# Patient Record
Sex: MALE | Race: BLACK OR AFRICAN AMERICAN | URBAN - METROPOLITAN AREA
[De-identification: names, ages, dates, MRNs, and addresses within clinical notes are randomized per-mention and may not be internally consistent; named-entity substitution may affect disease eponyms.]

---

## 2023-09-11 LAB — GROUP A STREP, PCR: NOT DETECTED

## 2024-02-27 PROCEDURE — 87800 DETECT AGNT MULT DNA DIREC: CPT

## 2024-02-28 ENCOUNTER — LAB REQUISITION (OUTPATIENT)
Dept: LAB | Facility: HOSPITAL | Age: 24
End: 2024-02-28

## 2024-02-28 DIAGNOSIS — Z20.2 CONTACT WITH AND (SUSPECTED) EXPOSURE TO INFECTIONS WITH A PREDOMINANTLY SEXUAL MODE OF TRANSMISSION: ICD-10-CM

## 2024-02-28 LAB
C TRACH RRNA SPEC QL NAA+PROBE: NEGATIVE
N GONORRHOEA DNA SPEC QL PROBE+SIG AMP: NEGATIVE

## 2024-10-02 ENCOUNTER — OFFICE VISIT (OUTPATIENT)
Dept: URGENT CARE | Age: 24
End: 2024-10-02
Payer: COMMERCIAL

## 2024-10-02 VITALS — HEART RATE: 97 BPM | TEMPERATURE: 98.5 F | OXYGEN SATURATION: 96 %

## 2024-10-02 DIAGNOSIS — Z20.828 EXPOSURE TO SARS-ASSOCIATED CORONAVIRUS: Primary | ICD-10-CM

## 2024-10-02 LAB — POC SARS-COV-2 AG BINAX: NORMAL

## 2024-10-02 NOTE — PROGRESS NOTES
Subjective   Patient ID: Joselito Rivas is a 24 y.o. male. They present today with a chief complaint of Request For Covid Testing.    History of Present Illness  States he had a positive COVID test on Sunday and needs proof of a negative one to return to work. No symptoms.     Past Medical History  Allergies as of 10/02/2024    (No Known Allergies)       (Not in a hospital admission)       Past Medical History:   Diagnosis Date    Allergy status to unspecified drugs, medicaments and biological substances     History of seasonal allergies    Chlamydial infection, unspecified 10/14/2017    Chlamydia    Personal history of other specified conditions 09/06/2022    History of headache       Past Surgical History:   Procedure Laterality Date    OTHER SURGICAL HISTORY  07/31/2014    Dental Surgery            Review of Systems  Review of Systems                               Objective    Vitals:    10/02/24 1940   Pulse: 97   Temp: 36.9 °C (98.5 °F)   SpO2: 96%     No LMP for male patient.    Physical Exam  Constitutional:       Appearance: Normal appearance.   HENT:      Head: Normocephalic and atraumatic.   Cardiovascular:      Rate and Rhythm: Normal rate and regular rhythm.      Pulses: Normal pulses.      Heart sounds: Normal heart sounds.   Pulmonary:      Effort: Pulmonary effort is normal.      Breath sounds: Normal breath sounds.     Procedures    Point of Care Test & Imaging Results from this visit  No results found for this visit on 10/02/24.   No results found.    Diagnostic study results (if any) were reviewed by [facility].    Assessment/Plan   Allergies, medications, history, and pertinent labs/EKGs/Imaging reviewed by ERIKA Abreu-CNP.     Medical Decision Making  COVID test was negative.  Patient was provided with a note for his work regarding Negative COVID test.  At time of discharge patient was clinically well-appearing and HDS for outpatient management. The patient and/or family was  educated regarding diagnosis, supportive care, OTC and Rx medications. The patient and/or family was given the opportunity to ask questions prior to discharge.  They verbalized understanding of my discussion of the plans for treatment, expected course, indications to return to  or seek further evaluation in ED, and the need for timely follow up as directed.   They were provided with a work/school excuse if requested.      Orders and Diagnoses  There are no diagnoses linked to this encounter.    Medical Admin Record      Patient disposition: Home    Electronically signed by [facility]  7:41 PM

## 2024-12-03 ENCOUNTER — OFFICE VISIT (OUTPATIENT)
Dept: URGENT CARE | Age: 24
End: 2024-12-03
Payer: COMMERCIAL

## 2024-12-03 VITALS
HEART RATE: 73 BPM | SYSTOLIC BLOOD PRESSURE: 151 MMHG | RESPIRATION RATE: 17 BRPM | DIASTOLIC BLOOD PRESSURE: 79 MMHG | BODY MASS INDEX: 23.75 KG/M2 | TEMPERATURE: 98.2 F | WEIGHT: 180 LBS | OXYGEN SATURATION: 100 %

## 2024-12-03 DIAGNOSIS — R19.7 DIARRHEA, UNSPECIFIED TYPE: ICD-10-CM

## 2024-12-03 DIAGNOSIS — R11.2 NAUSEA AND VOMITING IN ADULT: Primary | ICD-10-CM

## 2024-12-03 RX ORDER — LOPERAMIDE HCL 2 MG
2 TABLET ORAL 4 TIMES DAILY PRN
Qty: 30 TABLET | Refills: 0 | Status: SHIPPED | OUTPATIENT
Start: 2024-12-03 | End: 2024-12-13

## 2024-12-03 RX ORDER — ONDANSETRON 4 MG/1
4 TABLET, ORALLY DISINTEGRATING ORAL EVERY 8 HOURS PRN
Qty: 20 TABLET | Refills: 0 | Status: SHIPPED | OUTPATIENT
Start: 2024-12-03 | End: 2024-12-10

## 2024-12-03 NOTE — PROGRESS NOTES
Subjective   Patient ID: Joselito Rivas is a 24 y.o. male. They present today with a chief complaint of Abdominal Pain, Diarrhea, and Vomiting (Pt c/o stomach cramps, vomiting bile, diarrhea since Sunday. Saturday ate shrimp ceviche; raw shrimp. Since then not feeling well).    History of Present Illness  25 yo male coming in for nausea, vomiting, and diarrhea since Sunday night. He states he had to leave work early on Sunday because he couldn't stop going to the bathroom. He denies any abdominal pain. He denies any fevers or chills. He denies any other complaints.     Past Medical History  Allergies as of 12/03/2024    (No Known Allergies)       (Not in a hospital admission)       Past Medical History:   Diagnosis Date    Allergy status to unspecified drugs, medicaments and biological substances     History of seasonal allergies    Chlamydial infection, unspecified 10/14/2017    Chlamydia    Personal history of other specified conditions 09/06/2022    History of headache       Past Surgical History:   Procedure Laterality Date    OTHER SURGICAL HISTORY  07/31/2014    Dental Surgery       Drug use questions deferred to the physician. He reports that he does not drink alcohol.    Review of Systems  Review of Systems:  General: No weight loss, fatigue, anorexia, insomnia, fever, chills.  Cardiac: No chest pain, palpitations, syncope, near syncope.  Pulmonary:  No shortness of breath, cough, hemoptysis  Heme/lymph: No swollen glands, fever, bleeding  GI: No abdominal pain, change in bowel habits, melena, hematemesis, hematochezia, positive nausea, vomiting, and diarrhea  : No discharge, dysuria, frequency, urgency, hematuria  Musculoskeletal: No limb pain, joint pain, joint swelling.  Skin: No rashes  Neuro: No numbness, tingling, headaches                                 Objective    Vitals:    12/03/24 1838   BP: 151/79   BP Location: Left arm   Patient Position: Sitting   BP Cuff Size: Adult   Pulse: 73   Resp: 17    Temp: 36.8 °C (98.2 °F)   TempSrc: Oral   SpO2: 100%   Weight: 81.6 kg (180 lb)     No LMP for male patient.    Physical Exam  Physical Exam:  General: Vital noted, no distress. Afebrile  Cardiac: Regular rate and rhythm, no murmur  Pulmonary: Lungs clear bilaterally with good aeration. No adventitious breath sounds.  Abdomen: Soft, nontender, nonsurgical. No peritoneal signs. Normoactive bowel sounds.       Procedures    Point of Care Test & Imaging Results from this visit  No results found for this visit on 12/03/24.   No results found.    Diagnostic study results (if any) were reviewed by CHRISTIE Virk.    Assessment/Plan   Allergies, medications, history, and pertinent labs/EKGs/Imaging reviewed by CHRISTIE Virk.     Medical Decision Making  Treatment: Zofran and loperamide prescribed  Differential: 1) nausea and vomiting , 2) gastritis , 3) covid  Plan: Patient will follow up with the PCP in the next 2-3 days. Return for any worsening symptoms or go to the ER for further evaluation. Patient understands return precautions and discharge insturctions.  Impression:   1) nausea and vomiting  2) diarrhea      Orders and Diagnoses  Diagnoses and all orders for this visit:  Nausea and vomiting in adult  -     ondansetron ODT (Zofran-ODT) 4 mg disintegrating tablet; Dissolve 1 tablet (4 mg) in the mouth every 8 hours if needed for nausea or vomiting for up to 7 days.  Diarrhea, unspecified type  -     loperamide (Imodium A-D) 2 mg tablet; Take 1 tablet (2 mg) by mouth 4 times a day as needed for diarrhea for up to 10 days.      Medical Admin Record      Patient disposition: Home    Electronically signed by CHRISTIE Virk  6:55 PM

## 2024-12-03 NOTE — LETTER
December 3, 2024     Patient: Joselito Rivas   YOB: 2000   Date of Visit: 12/3/2024       To Whom It May Concern:    It is my medical opinion that Joselito Rivas may return to work on 12/4/24 .    If you have any questions or concerns, please don't hesitate to call.         Sincerely,        Tejal Vieyra, ERIKA-CNP    CC: No Recipients

## 2024-12-26 ENCOUNTER — OFFICE VISIT (OUTPATIENT)
Dept: URGENT CARE | Age: 24
End: 2024-12-26
Payer: COMMERCIAL

## 2024-12-26 VITALS
HEART RATE: 77 BPM | BODY MASS INDEX: 23.59 KG/M2 | HEIGHT: 73 IN | DIASTOLIC BLOOD PRESSURE: 79 MMHG | OXYGEN SATURATION: 99 % | SYSTOLIC BLOOD PRESSURE: 129 MMHG | WEIGHT: 178 LBS | RESPIRATION RATE: 18 BRPM

## 2024-12-26 DIAGNOSIS — Z20.2 EXPOSURE TO SEXUALLY TRANSMITTED DISEASE (STD): Primary | ICD-10-CM

## 2024-12-26 DIAGNOSIS — R30.0 DYSURIA: ICD-10-CM

## 2024-12-26 LAB
POC APPEARANCE, URINE: CLEAR
POC BILIRUBIN, URINE: NEGATIVE
POC BLOOD, URINE: NEGATIVE
POC COLOR, URINE: ABNORMAL
POC GLUCOSE, URINE: NEGATIVE MG/DL
POC KETONES, URINE: ABNORMAL MG/DL
POC LEUKOCYTES, URINE: NEGATIVE
POC NITRITE,URINE: NEGATIVE
POC PH, URINE: 6 PH
POC PROTEIN, URINE: NEGATIVE MG/DL
POC SPECIFIC GRAVITY, URINE: >=1.03
POC UROBILINOGEN, URINE: 0.2 EU/DL

## 2024-12-26 PROCEDURE — 87491 CHLMYD TRACH DNA AMP PROBE: CPT

## 2024-12-26 PROCEDURE — 87591 N.GONORRHOEAE DNA AMP PROB: CPT

## 2024-12-26 NOTE — PROGRESS NOTES
"Subjective   History of Present Illness: Joselito Rivas is a 24 y.o. male. They present today with a chief complaint of pain during urinating (Pt states he's been having pain while urinating since yesterday. Pt concerned for STD's. ).          Past Medical History  Allergies as of 12/26/2024    (No Known Allergies)       (Not in a hospital admission)       Past Medical History:   Diagnosis Date    Allergy status to unspecified drugs, medicaments and biological substances     History of seasonal allergies    Chlamydial infection, unspecified 10/14/2017    Chlamydia    Personal history of other specified conditions 09/06/2022    History of headache       Past Surgical History:   Procedure Laterality Date    OTHER SURGICAL HISTORY  07/31/2014    Dental Surgery       Drug use questions deferred to the physician. He reports that he does not drink alcohol.    Review of Systems  Review of Systems                               Objective    Vitals:    12/26/24 1534   BP: 129/79   Pulse: 77   Resp: 18   SpO2: 99%   Weight: 80.7 kg (178 lb)   Height: 1.854 m (6' 1\")     No LMP for male patient.    Physical Exam  Vitals reviewed.   HENT:      Head: Normocephalic and atraumatic.      Nose: Nose normal.      Mouth/Throat:      Mouth: Mucous membranes are moist.   Eyes:      Extraocular Movements: Extraocular movements intact.      Conjunctiva/sclera: Conjunctivae normal.      Pupils: Pupils are equal, round, and reactive to light.   Cardiovascular:      Rate and Rhythm: Normal rate and regular rhythm.   Pulmonary:      Effort: Pulmonary effort is normal.      Breath sounds: Normal breath sounds.   Skin:     General: Skin is warm.   Neurological:      Mental Status: He is alert and oriented to person, place, and time.   Psychiatric:         Mood and Affect: Mood normal.         Behavior: Behavior normal.             Point of Care Test & Imaging Results from this visit  Results for orders placed or performed in visit on 12/26/24 "   POCT UA Automated manually resulted   Result Value Ref Range    POC Color, Urine Ashlee (A) Straw, Yellow, Light-Yellow    POC Appearance, Urine Clear Clear    POC Glucose, Urine NEGATIVE NEGATIVE mg/dl    POC Bilirubin, Urine NEGATIVE NEGATIVE    POC Ketones, Urine TRACE (A) NEGATIVE mg/dl    POC Specific Gravity, Urine >=1.030 1.005 - 1.035    POC Blood, Urine NEGATIVE NEGATIVE    POC PH, Urine 6.0 No Reference Range Established PH    POC Protein, Urine NEGATIVE NEGATIVE mg/dl    POC Urobilinogen, Urine 0.2 0.2, 1.0 EU/DL    Poc Nitrite, Urine NEGATIVE NEGATIVE    POC Leukocytes, Urine NEGATIVE NEGATIVE      No results found.    Diagnostic study results (if any) were reviewed by Niels Gee PA-C.    Assessment/Plan   Allergies, medications, history, and pertinent labs/EKGs/Imaging reviewed by Niels Gee PA-C.     Medical Decision Making  - neg. UA. Will send out STI urine.  -         Patient is educated about their diagnoses.     -          Discussed medications benefits and adverse effects.     -          Answered all patient’s questions.     -          Patient will call 911 or go to the nearest ED if worsen symptoms .     -          Patient is agreeable to the plan of care and is deemed stable upon discharge.     -          Follow up with your primary care provider in two days.      Orders and Diagnoses  Diagnoses and all orders for this visit:  Exposure to sexually transmitted disease (STD)  -     Chlamydia trachomatis, Amplified  -     C. trachomatis / N. gonorrhoeae, Amplified  Dysuria  -     POCT UA Automated manually resulted      Medical Admin Record      Patient disposition: Home    Electronically signed by Niels Gee PA-C  3:56 PM

## 2024-12-27 LAB
C TRACH RRNA SPEC QL NAA+PROBE: NEGATIVE
C TRACH RRNA SPEC QL NAA+PROBE: NEGATIVE
N GONORRHOEA DNA SPEC QL PROBE+SIG AMP: NEGATIVE

## 2025-06-01 ENCOUNTER — OFFICE VISIT (OUTPATIENT)
Dept: URGENT CARE | Age: 25
End: 2025-06-01
Payer: COMMERCIAL

## 2025-06-01 VITALS
RESPIRATION RATE: 16 BRPM | HEART RATE: 78 BPM | BODY MASS INDEX: 23.59 KG/M2 | OXYGEN SATURATION: 98 % | SYSTOLIC BLOOD PRESSURE: 124 MMHG | WEIGHT: 178 LBS | DIASTOLIC BLOOD PRESSURE: 76 MMHG | HEIGHT: 73 IN | TEMPERATURE: 98.3 F

## 2025-06-01 DIAGNOSIS — R30.0 DYSURIA: Primary | ICD-10-CM

## 2025-06-01 PROCEDURE — 99213 OFFICE O/P EST LOW 20 MIN: CPT | Performed by: FAMILY MEDICINE

## 2025-06-01 PROCEDURE — 3008F BODY MASS INDEX DOCD: CPT | Performed by: FAMILY MEDICINE

## 2025-06-01 ASSESSMENT — PAIN SCALES - GENERAL: PAINLEVEL_OUTOF10: 0-NO PAIN

## 2025-06-01 NOTE — PROGRESS NOTES
"Subjective   Patient ID: Joselito Rivas is a 25 y.o. male. He presents today with a chief complaint of Other (STD check, for two days has burning when urinating and discharge ).    History of Present Illness  HPI    Past Medical History  Allergies as of 06/01/2025    (No Known Allergies)       Prescriptions Prior to Admission[1]     Medical History[2]    Surgical History[3]     reports that he has quit smoking. His smoking use included cigarettes. He does not have any smokeless tobacco history on file. Drug use questions deferred to the physician. He reports that he does not drink alcohol.    Review of Systems  Review of Systems                               Objective    Vitals:    06/01/25 1635   BP: 124/76   Pulse: 78   Resp: 16   Temp: 36.8 °C (98.3 °F)   TempSrc: Oral   SpO2: 98%   Weight: 80.7 kg (178 lb)   Height: 1.854 m (6' 1\")     No LMP for male patient.    Physical Exam  Vitals and nursing note reviewed.   Constitutional:       General: He is not in acute distress.     Appearance: Normal appearance. He is not toxic-appearing.   Abdominal:      General: Abdomen is flat. Bowel sounds are normal.      Palpations: Abdomen is soft.      Tenderness: There is no right CVA tenderness or left CVA tenderness.   Neurological:      Mental Status: He is alert.         Procedures    Point of Care Test & Imaging Results from this visit  No results found for this visit on 06/01/25.   Imaging  No results found.    Cardiology, Vascular, and Other Imaging  No other imaging results found for the past 2 days      Diagnostic study results (if any) were reviewed by Carolin Krueger MD.    Assessment/Plan   Allergies, medications, history, and pertinent labs/EKGs/Imaging reviewed by Carolin Krueger MD.     Medical Decision Making      Orders and Diagnoses  There are no diagnoses linked to this encounter.    Medical Admin Record      Patient disposition: Home    Electronically signed by Carolin Krueger MD  4:39 PM           [1] (Not " in a hospital admission)  [2]   Past Medical History:  Diagnosis Date    Allergy status to unspecified drugs, medicaments and biological substances     History of seasonal allergies    Chlamydial infection, unspecified 10/14/2017    Chlamydia    Personal history of other specified conditions 09/06/2022    History of headache   [3]   Past Surgical History:  Procedure Laterality Date    OTHER SURGICAL HISTORY  07/31/2014    Dental Surgery

## 2025-06-03 LAB
BACTERIA UR CULT: NORMAL
C TRACH RRNA SPEC QL NAA+PROBE: NOT DETECTED
N GONORRHOEA RRNA SPEC QL NAA+PROBE: NOT DETECTED
QUEST GC CT AMPLIFIED (ALWAYS MESSAGE): NORMAL
T VAGINALIS RRNA SPEC QL NAA+PROBE: NOT DETECTED

## 2025-06-24 ENCOUNTER — OFFICE VISIT (OUTPATIENT)
Dept: URGENT CARE | Age: 25
End: 2025-06-24
Payer: COMMERCIAL

## 2025-06-24 VITALS
BODY MASS INDEX: 24.25 KG/M2 | RESPIRATION RATE: 19 BRPM | WEIGHT: 183 LBS | OXYGEN SATURATION: 98 % | HEART RATE: 87 BPM | HEIGHT: 73 IN | DIASTOLIC BLOOD PRESSURE: 81 MMHG | SYSTOLIC BLOOD PRESSURE: 129 MMHG

## 2025-06-24 DIAGNOSIS — J02.9 SORE THROAT: ICD-10-CM

## 2025-06-24 DIAGNOSIS — J02.0 STREP PHARYNGITIS: Primary | ICD-10-CM

## 2025-06-24 LAB
POC HUMAN RHINOVIRUS PCR: NEGATIVE
POC INFLUENZA A VIRUS PCR: NEGATIVE
POC INFLUENZA B VIRUS PCR: NEGATIVE
POC RESPIRATORY SYNCYTIAL VIRUS PCR: NEGATIVE
POC STREPTOCOCCUS PYOGENES (GROUP A STREP) PCR: POSITIVE

## 2025-06-24 PROCEDURE — 87631 RESP VIRUS 3-5 TARGETS: CPT | Performed by: STUDENT IN AN ORGANIZED HEALTH CARE EDUCATION/TRAINING PROGRAM

## 2025-06-24 PROCEDURE — 99213 OFFICE O/P EST LOW 20 MIN: CPT | Performed by: STUDENT IN AN ORGANIZED HEALTH CARE EDUCATION/TRAINING PROGRAM

## 2025-06-24 PROCEDURE — 3008F BODY MASS INDEX DOCD: CPT | Performed by: STUDENT IN AN ORGANIZED HEALTH CARE EDUCATION/TRAINING PROGRAM

## 2025-06-24 PROCEDURE — 87651 STREP A DNA AMP PROBE: CPT | Performed by: STUDENT IN AN ORGANIZED HEALTH CARE EDUCATION/TRAINING PROGRAM

## 2025-06-24 RX ORDER — AMOXICILLIN 875 MG/1
875 TABLET, COATED ORAL 2 TIMES DAILY
Qty: 14 TABLET | Refills: 0 | Status: SHIPPED | OUTPATIENT
Start: 2025-06-24 | End: 2025-07-01

## 2025-06-24 NOTE — LETTER
June 24, 2025     Patient: Joselito Rivas   YOB: 2000   Date of Visit: 6/24/2025       To Whom It May Concern:    It is my medical opinion that Joselito Rivas may return to work on 06/26/2025.    If you have any questions or concerns, please don't hesitate to call.         Sincerely,        Nick Palomo DO    CC: No Recipients

## 2025-06-24 NOTE — PROGRESS NOTES
"Subjective   Patient ID: Joselito Rivas is a 25 y.o. male. They present today with a chief complaint of Sore Throat (Dry/ scratchy cough. Painful to swallow.).    History of Present Illness  Joselito is a 25 year old male who presents to the urgent care for evaluation of sore throat and painful swallowing.  Patient describes several days of painful swallowing and sore throat without associated chest pain or shortness of breath or choking.  Patient does note initially having some fever like symptoms that have since resolved.  Patient notes slight dry nonproductive cough.  Patient states he has been told in the past that his tonsils are enlarged and likely need removal however has not been able to do it and gets recurrent tonsillitis as a result.  Patient is seeking evaluation reassurance and strep testing.  No other symptoms or concerns at reported.    Past Medical History  Allergies as of 06/24/2025    (No Known Allergies)       Prescriptions Prior to Admission[1]     Medical History[2]    Surgical History[3]     reports that he has been smoking cigarettes. He does not have any smokeless tobacco history on file. Drug use questions deferred to the physician. He reports that he does not drink alcohol.    Review of Systems  A 10-point review of systems was performed, otherwise unremarkable unless stated in the history of present illness.                Objective    Vitals:    06/24/25 1737   BP: 129/81   Pulse: 87   Resp: 19   SpO2: 98%   Weight: 83 kg (183 lb)   Height: 1.854 m (6' 1\")     No LMP for male patient.    Gen: Vitals noted and reviewed, no evidence of acute distress, well developed and afebrile.   Head/Face: Atraumatic and normocephalic.   ENT: TMs clear bilaterally, EACs unremarkable. Mastoids non-tender. Posterior oropharynx with erythema, exudates, and 1+ b/l tonsillar swelling. Uvula is in the midline and non-edematous.   Neck: Supple. No meningismus through full range of motion. +b/l anterior cervical " lymphadenopathy.   Cardiac: Regular rate and rhythm no murmur.   Lungs: Clear to auscultation throughout, No evidence of wheezing, rhonchi or crackles. Good aeration throughout.   Skin: Without evidence of ecchymosis, wounds, or rashes.  Psych: Mood and affect appropriate for setting.     Point of Care Test & Imaging Results from this visit  Results for orders placed or performed in visit on 06/24/25   POCT SPOTFIRE R/ST Panel Mini w/Strep A ([employer]) manually resulted   Result Value Ref Range    POC Group A Strep, PCR Positive (A) Negative    POC Respiratory Syncytial Virus PCR Negative Negative    POC Influenza A Virus PCR Negative Negative    POC Influenza B Virus PCR Negative Negative    POC Human Rhinovirus PCR Negative Negative      Imaging  No results found.    Cardiology, Vascular, and Other Imaging  No other imaging results found for the past 2 days      Diagnostic study results (if any) were reviewed by Nick Palomo DO.    Assessment/Plan   Allergies, medications, history, and pertinent labs/EKGs/Imaging reviewed by Nick Palomo DO.     Medical Decision Making  Discussed with the patient symptoms and clinical presentation findings suggestive of an acute pharyngitis likely secondary strep infection in the setting of history of recurrent tonsillitis.  Would advise close symptom monitoring supportive treatment use of over-the-counter remedies to aid in symptom management.  Reviewed patient allergies medications are prescribed amoxicillin for antimicrobial coverage.  We did provide a referral to primary care to establish. Follow up with Primary Care Provider. We advised seeking immediate emergency medical attention if symptoms fail to improve, worsen or any concerning symptoms arise. Patient voiced full understanding and agreement to plan.      Orders and Diagnoses  Diagnoses and all orders for this visit:  Strep pharyngitis  -     Referral to Primary Care; Future  -     amoxicillin (Amoxil) 875 mg  tablet; Take 1 tablet (875 mg) by mouth 2 times a day for 7 days.  Sore throat  -     POCT SPOTFIRE R/ST Panel Mini w/Strep A ([facility]) manually resulted  -     Referral to Primary Care; Future  -     amoxicillin (Amoxil) 875 mg tablet; Take 1 tablet (875 mg) by mouth 2 times a day for 7 days.      Medical Admin Record      Patient disposition: Home    Electronically signed by Nick Palomo DO  6:31 PM           [1] (Not in a hospital admission)   [2]   Past Medical History:  Diagnosis Date    Allergy status to unspecified drugs, medicaments and biological substances     History of seasonal allergies    Chlamydial infection, unspecified 10/14/2017    Chlamydia    Personal history of other specified conditions 09/06/2022    History of headache   [3]   Past Surgical History:  Procedure Laterality Date    OTHER SURGICAL HISTORY  07/31/2014    Dental Surgery

## 2025-07-26 ENCOUNTER — OFFICE VISIT (OUTPATIENT)
Dept: URGENT CARE | Age: 25
End: 2025-07-26
Payer: COMMERCIAL

## 2025-07-26 VITALS
BODY MASS INDEX: 23.86 KG/M2 | RESPIRATION RATE: 16 BRPM | SYSTOLIC BLOOD PRESSURE: 115 MMHG | DIASTOLIC BLOOD PRESSURE: 72 MMHG | WEIGHT: 180 LBS | TEMPERATURE: 97.7 F | HEART RATE: 71 BPM | OXYGEN SATURATION: 96 % | HEIGHT: 73 IN

## 2025-07-26 DIAGNOSIS — S61.217A LACERATION OF LEFT LITTLE FINGER WITHOUT FOREIGN BODY WITHOUT DAMAGE TO NAIL, INITIAL ENCOUNTER: Primary | ICD-10-CM

## 2025-07-26 DIAGNOSIS — S61.412A LACERATION OF LEFT HAND WITHOUT FOREIGN BODY, INITIAL ENCOUNTER: ICD-10-CM

## 2025-07-26 RX ORDER — CEPHALEXIN 500 MG/1
500 CAPSULE ORAL 2 TIMES DAILY
Qty: 14 CAPSULE | Refills: 0 | Status: SHIPPED | OUTPATIENT
Start: 2025-07-26 | End: 2025-08-02

## 2025-07-26 ASSESSMENT — PAIN SCALES - GENERAL: PAINLEVEL_OUTOF10: 10-WORST PAIN EVER

## 2025-07-26 ASSESSMENT — ENCOUNTER SYMPTOMS: WOUND: 1

## 2025-07-26 NOTE — PROGRESS NOTES
"Subjective   Patient ID: Joselito Rivas is a 25 y.o. male. They present today with a chief complaint of Injury (Patient cut left hand in between thumb and index about 3p today on broken window).    History of Present Illness  Patient is a 25 year old male presenting for hand laceration. He was pulling a window AC unit out and lifting up window with left hand when left hand accidentally went through the window. Lacerated left first web space and fifth finger. Tdap updated 6/19/24 per chart. Right hand dominant.      History provided by:  Patient   used: No    Injury      Past Medical History  Allergies as of 07/26/2025    (No Known Allergies)       Prescriptions Prior to Admission[1]     Medical History[2]    Surgical History[3]     reports that he has been smoking cigarettes. He does not have any smokeless tobacco history on file. Drug use questions deferred to the physician. He reports that he does not drink alcohol.    Review of Systems  Review of Systems   Skin:  Positive for wound.                                  Objective    Vitals:    07/26/25 1538   BP: 115/72   Pulse: 71   Resp: 16   Temp: 36.5 °C (97.7 °F)   TempSrc: Oral   SpO2: 96%   Weight: 81.6 kg (180 lb)   Height: 1.854 m (6' 1\")     No LMP for male patient.    Physical Exam  Constitutional:       General: He is not in acute distress.     Appearance: Normal appearance. He is normal weight. He is not ill-appearing or toxic-appearing.   HENT:      Head: Normocephalic.     Eyes:      General:         Right eye: No discharge.         Left eye: No discharge.      Conjunctiva/sclera: Conjunctivae normal.     Neck:      Trachea: Phonation normal.     Cardiovascular:      Rate and Rhythm: Normal rate.   Pulmonary:      Effort: No respiratory distress.      Breath sounds: Normal breath sounds. No stridor. No wheezing.     Musculoskeletal:      Comments: Left hand:  Thumb opposition intact  Flexion and extension MCP, PIP and DIP joints " intact      Skin:     Comments: 1 cm laceration to left fifth finger distal phalanx, no involvement of nail. No bleeding or foreign body.   2 cm laceration first webspace of left hand, gapping. No bleeding or foreign body.     Neurological:      Mental Status: He is alert.     Psychiatric:         Mood and Affect: Mood normal.         Behavior: Behavior normal.         Thought Content: Thought content normal.         Laceration Repair    Date/Time: 7/26/2025 4:43 PM    Performed by: Lianne Sánchez PA-C  Authorized by: Lianne Sánchez PA-C    Consent:     Consent obtained:  Verbal    Consent given by:  Patient    Risks discussed:  Infection, pain, retained foreign body, poor cosmetic result and poor wound healing    Alternatives discussed:  No treatment  Universal protocol:     Patient identity confirmed:  Verbally with patient  Anesthesia:     Anesthesia method:  None  Laceration details:     Location:  Hand    Hand location:  L palm    Length (cm):  2  Pre-procedure details:     Preparation:  Patient was prepped and draped in usual sterile fashion  Exploration:     Hemostasis achieved with:  Direct pressure    Wound exploration: wound explored through full range of motion and entire depth of wound visualized      Wound extent: no foreign bodies/material noted, no muscle damage noted, no nerve damage noted, no tendon damage noted, no underlying fracture noted and no vascular damage noted      Contaminated: no    Treatment:     Area cleansed with:  Povidone-iodine and chlorhexidine  Skin repair:     Repair method:  Tissue adhesive  Approximation:     Approximation:  Close  Repair type:     Repair type:  Simple  Post-procedure details:     Dressing:  Non-adherent dressing    Procedure completion:  Tolerated well, no immediate complications  Laceration Repair    Date/Time: 7/26/2025 4:44 PM    Performed by: Lianne Sánchez PA-C  Authorized by: Lianne Sánchez PA-C    Consent:     Consent obtained:  Verbal     Consent given by:  Patient    Risks discussed:  Infection, pain, retained foreign body, poor cosmetic result and poor wound healing  Universal protocol:     Patient identity confirmed:  Verbally with patient  Anesthesia:     Anesthesia method:  None  Laceration details:     Location:  Finger    Finger location:  L small finger    Length (cm):  1  Pre-procedure details:     Preparation:  Patient was prepped and draped in usual sterile fashion  Exploration:     Hemostasis achieved with:  Direct pressure    Wound exploration: wound explored through full range of motion and entire depth of wound visualized      Wound extent: no foreign bodies/material noted, no muscle damage noted, no nerve damage noted, no tendon damage noted, no underlying fracture noted and no vascular damage noted    Treatment:     Area cleansed with:  Chlorhexidine  Skin repair:     Repair method:  Tissue adhesive  Approximation:     Approximation:  Close  Post-procedure details:     Dressing:  Non-adherent dressing    Procedure completion:  Tolerated well, no immediate complications      Point of Care Test & Imaging Results from this visit  No results found for this visit on 07/26/25.   Imaging  No results found.    Cardiology, Vascular, and Other Imaging  No other imaging results found for the past 2 days      Diagnostic study results (if any) were reviewed by Lianne Sánchez PA-C.    Assessment/Plan   Allergies, medications, history, and pertinent labs/EKGs/Imaging reviewed by Lianne Sánchez PA-C.     Medical Decision Making  Patient is a 25 year old male presenting for left hand laceration on broken glass. Hemodynamically stable. Exam with 1 cm laceration to left fifth finger distal phalanx, no involvement of nail. No bleeding or foreign body. 2 cm laceration first webspace of left hand, gapping. No bleeding or foreign body. FROM left hand and fingers and neurovascularly intact. No bony deformities or tenderness, do not suspect fracture.  Refused sutures, requested glue which was applied. Advised to keep wound clean and dry and return if signs of infection.     At time of discharge, patient was clinically well-appearing and appropriate for outpatient management. The patient/parent/guardian was educated regarding diagnosis, supportive care, OTC and Rx medications. The patient/parent/guardian was given the opportunity to ask questions prior to discharge. They verbalized understanding of discussion of treatment plan, expected course of illness and/or injury, indications on when to return to , when to seek further evaluation in ED/call 911, and the need to follow up with PCP and/or specialist as referred. Patient/parent/guardian was provided with work/school documentation if requested. Patient stable upon discharge.     Orders and Diagnoses  Diagnoses and all orders for this visit:  Laceration of left little finger without foreign body without damage to nail, initial encounter  Laceration of left hand without foreign body, initial encounter  -     cephalexin (Keflex) 500 mg capsule; Take 1 capsule (500 mg) by mouth 2 times a day for 7 days.      Medical Admin Record      Patient disposition: Home    Electronically signed by Lianne Sánchez PA-C  4:43 PM           [1] (Not in a hospital admission)   [2]   Past Medical History:  Diagnosis Date    Allergy status to unspecified drugs, medicaments and biological substances     History of seasonal allergies    Chlamydial infection, unspecified 10/14/2017    Chlamydia    Personal history of other specified conditions 09/06/2022    History of headache   [3]   Past Surgical History:  Procedure Laterality Date    OTHER SURGICAL HISTORY  07/31/2014    Dental Surgery

## 2025-07-26 NOTE — PATIENT INSTRUCTIONS
Avoid soaking left hand in water, dry well after water exposure   Return if new or worsening symptoms including signs of infection

## 2025-07-26 NOTE — LETTER
July 26, 2025     Patient: Joselito Rivas   YOB: 2000   Date of Visit: 7/26/2025       To Whom It May Concern:    It is my medical opinion that Joselito Rivas may return to light duty immediately with the following restrictions: limit use of left hand due to lacerations, no heavy lifting or extensive finger manipulation. Wear gloves while working.     If you have any questions or concerns, please don't hesitate to call.         Sincerely,        Lianne Sánchez PA-C    CC: No Recipients